# Patient Record
Sex: MALE | Race: BLACK OR AFRICAN AMERICAN
[De-identification: names, ages, dates, MRNs, and addresses within clinical notes are randomized per-mention and may not be internally consistent; named-entity substitution may affect disease eponyms.]

---
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
## 2020-12-27 ENCOUNTER — HOSPITAL ENCOUNTER (EMERGENCY)
Dept: HOSPITAL 63 - ER | Age: 20
Discharge: HOME | End: 2020-12-27
Payer: COMMERCIAL

## 2020-12-27 VITALS — WEIGHT: 250.45 LBS | HEIGHT: 72 IN | BODY MASS INDEX: 33.92 KG/M2

## 2020-12-27 VITALS — SYSTOLIC BLOOD PRESSURE: 124 MMHG | DIASTOLIC BLOOD PRESSURE: 60 MMHG

## 2020-12-27 DIAGNOSIS — R07.89: Primary | ICD-10-CM

## 2020-12-27 DIAGNOSIS — R42: ICD-10-CM

## 2020-12-27 LAB
ALBUMIN SERPL-MCNC: 3.9 G/DL (ref 3.4–5)
ALBUMIN/GLOB SERPL: 1 {RATIO} (ref 1–1.7)
ALP SERPL-CCNC: 193 U/L (ref 46–116)
ALT SERPL-CCNC: 27 U/L (ref 16–63)
ANION GAP SERPL CALC-SCNC: 10 MMOL/L (ref 6–14)
AST SERPL-CCNC: 15 U/L (ref 15–37)
BASOPHILS # BLD AUTO: 0 X10^3/UL (ref 0–0.2)
BASOPHILS NFR BLD: 0 % (ref 0–3)
BILIRUB SERPL-MCNC: 0.3 MG/DL (ref 0.2–1)
BUN/CREAT SERPL: 13 (ref 6–20)
CA-I SERPL ISE-MCNC: 12 MG/DL (ref 8–26)
CALCIUM SERPL-MCNC: 8.8 MG/DL (ref 8.5–10.1)
CHLORIDE SERPL-SCNC: 103 MMOL/L (ref 98–107)
CO2 SERPL-SCNC: 27 MMOL/L (ref 21–32)
CREAT SERPL-MCNC: 0.9 MG/DL (ref 0.7–1.3)
EOSINOPHIL NFR BLD: 0.2 X10^3/UL (ref 0–0.7)
EOSINOPHIL NFR BLD: 2 % (ref 0–3)
ERYTHROCYTE [DISTWIDTH] IN BLOOD BY AUTOMATED COUNT: 13.6 % (ref 11.5–14.5)
GFR SERPLBLD BASED ON 1.73 SQ M-ARVRAT: 128.9 ML/MIN
GLOBULIN SER-MCNC: 3.9 G/DL (ref 2.2–3.8)
GLUCOSE SERPL-MCNC: 98 MG/DL (ref 70–99)
HCT VFR BLD CALC: 49.3 % (ref 39–53)
HGB BLD-MCNC: 16.1 G/DL (ref 13–17.5)
LYMPHOCYTES # BLD: 1.8 X10^3/UL (ref 1–4.8)
LYMPHOCYTES NFR BLD AUTO: 23 % (ref 24–48)
MAGNESIUM SERPL-MCNC: 2.2 MG/DL (ref 1.8–2.4)
MCH RBC QN AUTO: 28 PG (ref 25–35)
MCHC RBC AUTO-ENTMCNC: 33 G/DL (ref 31–37)
MCV RBC AUTO: 85 FL (ref 79–100)
MONO #: 0.7 X10^3/UL (ref 0–1.1)
MONOCYTES NFR BLD: 9 % (ref 0–9)
NEUT #: 5.1 X10^3UL (ref 1.8–7.7)
NEUTROPHILS NFR BLD AUTO: 66 % (ref 31–73)
PLATELET # BLD AUTO: 295 X10^3/UL (ref 140–400)
POTASSIUM SERPL-SCNC: 3.5 MMOL/L (ref 3.5–5.1)
PROT SERPL-MCNC: 7.8 G/DL (ref 6.4–8.2)
RBC # BLD AUTO: 5.82 X10^6/UL (ref 4.3–5.7)
SODIUM SERPL-SCNC: 140 MMOL/L (ref 136–145)
WBC # BLD AUTO: 7.9 X10^3/UL (ref 4–11)

## 2020-12-27 PROCEDURE — 85025 COMPLETE CBC W/AUTO DIFF WBC: CPT

## 2020-12-27 PROCEDURE — 71045 X-RAY EXAM CHEST 1 VIEW: CPT

## 2020-12-27 PROCEDURE — 36415 COLL VENOUS BLD VENIPUNCTURE: CPT

## 2020-12-27 PROCEDURE — 99285 EMERGENCY DEPT VISIT HI MDM: CPT

## 2020-12-27 PROCEDURE — 80053 COMPREHEN METABOLIC PANEL: CPT

## 2020-12-27 PROCEDURE — 93005 ELECTROCARDIOGRAM TRACING: CPT

## 2020-12-27 PROCEDURE — 83735 ASSAY OF MAGNESIUM: CPT

## 2020-12-27 PROCEDURE — 84484 ASSAY OF TROPONIN QUANT: CPT

## 2020-12-27 NOTE — RAD
EXAM: CHEST 1 VIEW 



History: Chest pain 



COMPARISON: 9/23/2017



TECHNIQUE: Single portable radiograph of the chest



FINDINGS:  The cardiac silhouette is unremarkable. The lungs are clear bilaterally. The costophrenic 
sulci are clear and well demarcated.



IMPRESSION:  No radiographic evidence of an acute cardiopulmonary process.

 



Electronically signed by: Adithya Haynes MD (12/27/2020 7:34 AM) [id number]

## 2020-12-27 NOTE — PHYS DOC
Past History


Past Medical History:  Other


Past Surgical History:  No Surgical History


Smoking:  Non-smoker


Alcohol Use:  None


Drug Use:  None





Adult General


Chief Complaint


Chief Complaint:  CHEST PAIN





HPI


HPI





Patient is a 20M with no known past history presents emergency department for 

new onset of lightheadedness and right-sided chest pain.  Patient states that 

yesterday morning he awoke from sleep with a sensation of right-sided chest nehemiah

n.  Patient states he woke up and then believes that he passed out back onto his

bed.  Since that time has been complaining of pain in the right anterior chest 

with radiation to the right shoulder and the right jaw.  Patient states he had a

similar episode approximately 2 months ago and was worked up and there were no 

significant findings.  Patient denies any cough, nausea, vomiting, dizziness, 

lightheadedness, back pain or paresthesias.





Review of Systems


Review of Systems





Constitutional: Denies fever or chills []


Eyes: Denies change in visual acuity, redness, or eye pain []


HENT: Denies nasal congestion or sore throat []


Respiratory: Denies cough or shortness of breath []


Cardiovascular: No additional information not addressed in HPI []


GI: Denies abdominal pain, nausea, vomiting, bloody stools or diarrhea []


: Denies dysuria or hematuria []


Musculoskeletal: Denies back pain or joint pain []


Integument: Denies rash or skin lesions []


Neurologic: Denies headache, focal weakness or sensory changes []


Endocrine: Denies polyuria or polydipsia []





All other systems were reviewed and found to be within normal limits, except as 

documented in this note.





Allergies


Allergies





Allergies








Coded Allergies Type Severity Reaction Last Updated Verified


 


  No Known Drug Allergies    9/3/17 No











Physical Exam


Physical Exam





Constitutional: Well developed, well nourished, no acute distress, non-toxic 

appearance. []


HENT: Normocephalic, atraumatic, bilateral external ears normal, oropharynx 

moist, no oral exudates, nose normal. []


Eyes: PERRLA, EOMI, conjunctiva normal, no discharge. [] 


Neck: Normal range of motion, no tenderness, supple, no stridor. [] 


Cardiovascular:Heart rate regular rhythm, no murmur []


Lungs & Thorax:  Bilateral breath sounds clear to auscultation []


Abdomen: Bowel sounds normal, soft, no tenderness, no masses, no pulsatile 

masses. [] 


Skin: Warm, dry, no erythema, no rash. [] 


Back: No tenderness, no CVA tenderness. [] 


Extremities: No tenderness, no cyanosis, no clubbing, ROM intact, no edema. [] 


Neurologic: Alert and oriented X 3, normal motor function, normal sensory 

function, no focal deficits noted. []


Psychologic: Affect normal, judgement normal, mood normal. []





Heart Score


HEART Score for Chest Pain:  








HEART Score for Chest Pain Response (Comments) Value


 


History Slighlty/Non-Suspicious 0


 


ECG Normal 0


 


Age < 45 0


 


Risk Factors No Risk Factors 0


 


Troponin < Normal Limit 0


 


Total  0








Risk Factors:


Risk Factors:  DM, Current or recent (<one month) smoker, HTN, HLP, family 

history of CAD, obesity.


Risk Scores:


Risk Factors:  DM, Current or recent (<one month) smoker, HTN, HLP, family 

history of CAD, obesity.





Course & Med Decision Making


Course & Med Decision Making


Pertinent Labs and Imaging studies reviewed. (See chart for details)





20-year-old male presenting with nonspecific right anterior chest pain but does 

report a history of syncope.  Patient does not have any known history of 

congenital cardiac abnormalities.  Low suspicion for acute urinary syndrome but 

the story is concerning.  At this time will obtain basic cardiac work-up with 

labs and chest





Dragon Disclaimer


Dragon Disclaimer


This electronic medical record was generated, in whole or in part, using a voice

 recognition dictation system.





Departure


Departure:


Impression:  


   Primary Impression:  


   Nonspecific chest pain


Disposition:  01 DC HOME SELF CARE/HOMELESS


Condition:  GOOD


Referrals:  


RICHARD CHANEY MD (PCP)








DL JANE MD


Patient Instructions:  Chest Pain (Nonspecific)





Additional Instructions:  


Thank you for visiting our emergency department.  You are seen for your chest 

pain.  An EKG, chest x-ray labs were done which did not demonstrate any obvious 

abnormality.  We strongly recommend he follow-up with a cardiologist as soon as 

possible.





Please return to the emergency department if there is any sudden worsening of 

your symptoms, dizziness or lightheadedness or fever greater than 101 F











ASHLEIGH CHO MD              Dec 27, 2020 07:21

## 2020-12-28 NOTE — EKG
[facility] [address], [place] [address]

Test Date:    2020               Test Time:    07:03:25

Pat Name:     MARCI JOSE             Department:   

Patient ID:   [id number]           Room:          

Gender:       M                        Technician:   PEEWEE

:          2000               Requested By: ASHLEIGH CHO

Order Number: [id number]            Reading MD:     

                                 Measurements

Intervals                              Axis          

Rate:         71                       P:            39

KS:           184                      QRS:          -61

QRSD:         94                       T:            0

QT:           382                                    

QTc:          420                                    

                           Interpretive Statements

SINUS RHYTHM

ABNORMAL LEFT AXIS DEVIATION

S1,S2,S3 PATTERN

LEFT ANTERIOR FASCICULAR BLOCK

ABNORMAL ECG

RI6.02

No previous ECG available for comparison

## 2021-05-10 ENCOUNTER — HOSPITAL ENCOUNTER (EMERGENCY)
Dept: HOSPITAL 63 - ER | Age: 21
Discharge: HOME | End: 2021-05-10
Payer: COMMERCIAL

## 2021-05-10 VITALS — HEIGHT: 72 IN | BODY MASS INDEX: 33.92 KG/M2 | WEIGHT: 250.45 LBS

## 2021-05-10 VITALS — SYSTOLIC BLOOD PRESSURE: 118 MMHG | DIASTOLIC BLOOD PRESSURE: 68 MMHG

## 2021-05-10 DIAGNOSIS — H92.01: Primary | ICD-10-CM

## 2021-05-10 NOTE — PHYS DOC
Past History


Past Medical History:  No Pertinent History, Other


 (IOANA BOSS)


Past Surgical History:  No Surgical History


 (IOANA BOSS)


Smoking:  Non-smoker


Alcohol Use:  None


Drug Use:  None


 (IOANA BOSS)





General Adult


EDM:


Chief Complaint:  EARACHE/EAR PAIN





HPI:


HPI:





Patient is a 20-year-old male who presents with right ear pain.  Patient states 

that he was put on clindamycin yesterday for dental infection.  Patient to start

taking clindamycin today.  Denies facial swelling.  Denies fevers.  Patient 

states has been taking ibuprofen at home with some relief.  Denies medical 

history.


 (IOANA BOSS)





Review of Systems:


Review of Systems:


Constitutional:  Denies fever or chills 


Eyes:  Denies change in visual acuity 


HENT: Reports right ear pain, right-sided dental pain, denies nasal congestion 

or sore throat 


Respiratory:  Denies cough or shortness of breath 


Cardiovascular:  Denies chest pain or edema 


GI:  Denies abdominal pain, nausea, vomiting, bloody stools or diarrhea 


: Denies dysuria 


Musculoskeletal:  Denies back pain or joint pain 


Integument:  Denies rash 


Neurologic:  Denies headache, focal weakness or sensory changes 


Endocrine:  Denies polyuria or polydipsia 


Lymphatic:  Denies swollen glands 


Psychiatric:  Denies depression or anxiety


 (IOANA BOSS)





Allergies:


Allergies:





Allergies








Coded Allergies Type Severity Reaction Last Updated Verified


 


  No Known Drug Allergies    9/3/17 No








 (IOANA BOSS)





Physical Exam:


PE:





Constitutional: Well developed, well nourished, no acute distress, non-toxic 

appearance. []


HENT:  bilateral external ears normal, oropharynx moist, no oral exudates, nose 

normal. []


Eyes: PERRLA, EOMI, conjunctiva normal, no discharge. [] 


Cardiovascular:Heart rate regular rhythm, no murmur []


Lungs & Thorax:  Bilateral breath sounds clear to auscultation []


 (IOANA BOSS)





EKG:


EKG:


[]


 (IOANA BOSS)





Radiology/Procedures:


Radiology/Procedures:


[]


 (IOANA BOSS)





Heart Score:


C/O Chest Pain:  No


Risk Factors:


Risk Factors:  DM, Current or recent (<one month) smoker, HTN, HLP, family 

history of CAD, obesity.


Risk Scores:


Score 0 - 3:  2.5% MACE over next 6 weeks - Discharge Home


Score 4 - 6:  20.3% MACE over next 6 weeks - Admit for Clinical Observation


Score 7 - 10:  72.7% MACE over next 6 weeks - Early Invasive Strategies


 (IOANA BOSS)





Course & Med Decision Making:


Course & Med Decision Making


Pertinent Labs and Imaging studies reviewed. (See chart for details)





[] 19-year-old male presents with right ear pain.  Patient to start taking 

clindamycin today for a dental infection.  Patient was seen at [facility] and 

prescribed antibiotic.  Patient given hydrocodone for discomfort.  Explained to 

patient his ear pain is coming from his dental infection.  Patient is to follow 

back up with his PCP on Wednesday if he is still having discomfort.  Continue 

taking ibuprofen and Tylenol for pain.


 (IOANA BOSS)





Dragon Disclaimer:


Dragon Disclaimer:


This electronic medical record was generated, in whole or in part, using a voice

 recognition dictation system.


 (IOANA BOSS)


Attending Co-Sign


The patient was seen and interviewed as well as examined at the bedside. The 

chart was reviewed. The case was discussed. Agree with the plan of care.


 (RICK TOMLINSON DO)





Departure


Departure:


Impression:  


   Primary Impression:  


   Ear pain, right


Disposition:  01 HOME / SELF CARE / HOMELESS


Condition:  STABLE


Referrals:  


PCP,NO (PCP)


Patient Instructions:  Dental Pain





Additional Instructions:  


You were seen in the emergency room for dental pain and right ear pain.  You 

just started clindamycin today for dental infection.  Continue taking antibiotic

 as directed.  You should see some improvement in the next 24 hours with your 

discomfort.  Continue taking ibuprofen and Tylenol for pain.  Follow-up with 

your PCP on Wednesday if your noticing swelling, fever, increasing pain.  Return

 to the emergency room with worsening symptoms or concerns.





EMERGENCY DEPARTMENT GENERAL DISCHARGE INSTRUCTIONS





Thank you for coming to [facility] (ED) today and trusting us

 with you 


care.  We trust that you had a positivie experience in our Emergency Department.

  If you 


wish to speak to the department management, you may call the director at [phone].





YOUR FOLLOW UP INSTRUCTIONS ARE AS FOLLOWS:





1.  Do you have a private Doctor?  If you do not have a private doctor, please 

ask for a 


resource list of physicians or clinics that may be able to assist you with 

follow up care.





2.  The Emergency Physician has interpreted your x-rays.  The X-Ray specialist 

will also 


review them.  If there is a change in the findings, you will be notified in 48 

hours when at 


all possible.





3.  A lab test or culture has been done, your results will be reviewed and you 

will be 


notified if you need a change in treatment.





ADDITIONAL INSTRUCTIONS AND INFORMATION:





1.  Your care today has been supervised by a physician who is specially trained 

in emergency 


care.  Many problems require more than one evaluation for a complete diagnosis 

and 


treatment.  We recommend that you schedule your follow up appointment as 

recommended to 


ensure complete treatment of you illness or injury.  If you are unable to obtain

 follow up 


care and continue to have a problem, or if your condition worsens, we recommend 

that you 


return to the ED.





2.  We are not able to safely determine your condition over the phone nor are we

 able to 


give sound medical advice over the phone.  For these safety reasons, if you call

 for medical 


advice we will ask you to come to the ED for further evaluation.





3.  If you have any questions regarding these discharge instructions please call

 the ED at 


[phone].





SAFETY INFORMATION:





In the interest of safety, wellness, and injury prevention; we encourage you to 

wear your 


sealbelt, if you smoke; quite smoking, and we encourage family to use a 

protective helmet 


for bicycling and other sporting events that present an increased risk for head 

injury.





IF YOUR SYMPTOMS WORSEN OR NEW SYMPTOMS DEVELOP, OR YOU HAVE CONCERNS ABOUT YOUR

 CONDITION; 


OR IF YOUR CONDITION WORSENS WHILE YOU ARE WAITING FOR YOUR FOLLOW UP 

APPOINTMENT; EITHER 


CONTACT YOUR PRIMARY CARE DOCTOR, THE PHYSICIAN WHOSE NAME AND NUMBER YOU WERE 

GIVEN, OR 


RETURN TO THE ED IMMEDIATELY.











IOANA BOSS               May 10, 2021 21:28


RICK TOMLINSON DO                 May 14, 2021 06:33

## 2021-07-23 ENCOUNTER — HOSPITAL ENCOUNTER (EMERGENCY)
Dept: HOSPITAL 63 - ER | Age: 21
Discharge: HOME | End: 2021-07-23
Payer: COMMERCIAL

## 2021-07-23 VITALS — HEIGHT: 72 IN | WEIGHT: 244.71 LBS | BODY MASS INDEX: 33.15 KG/M2

## 2021-07-23 VITALS — SYSTOLIC BLOOD PRESSURE: 122 MMHG | DIASTOLIC BLOOD PRESSURE: 62 MMHG

## 2021-07-23 DIAGNOSIS — S13.9XXA: Primary | ICD-10-CM
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
## 2021-07-23 DIAGNOSIS — S29.012A: ICD-10-CM

## 2021-07-23 PROCEDURE — 96372 THER/PROPH/DIAG INJ SC/IM: CPT

## 2021-07-23 PROCEDURE — 71046 X-RAY EXAM CHEST 2 VIEWS: CPT

## 2021-07-23 PROCEDURE — 99284 EMERGENCY DEPT VISIT MOD MDM: CPT

## 2021-07-23 PROCEDURE — 72125 CT NECK SPINE W/O DYE: CPT
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
## 2021-12-15 ENCOUNTER — HOSPITAL ENCOUNTER (EMERGENCY)
Dept: HOSPITAL 63 - ER | Age: 21
Discharge: HOME | End: 2021-12-15
Payer: COMMERCIAL

## 2021-12-15 VITALS — HEIGHT: 72 IN | BODY MASS INDEX: 26.87 KG/M2 | WEIGHT: 198.42 LBS

## 2021-12-15 VITALS — DIASTOLIC BLOOD PRESSURE: 68 MMHG | SYSTOLIC BLOOD PRESSURE: 137 MMHG

## 2021-12-15 DIAGNOSIS — S30.810A: Primary | ICD-10-CM
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
## 2021-12-15 DIAGNOSIS — Z91.041: ICD-10-CM
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
## 2021-12-15 PROCEDURE — 99282 EMERGENCY DEPT VISIT SF MDM: CPT

## 2021-12-15 NOTE — PHYS DOC
Past History


Past Medical History:  No Pertinent History, Other


 (IOANA BOSS)


Past Surgical History:  No Surgical History


Additional Past Surgical Histo:  HERNIA REPAIR


 (IOANA BOSS)


Smoking:  Non-smoker


Alcohol Use:  Occasionally


Drug Use:  None


 (IOANA BOSS)





General Adult


EDM:


Chief Complaint:  BACK PAIN OR INJURY





HPI:


HPI:





Patient is a 21-year-old male who presents with pain near his coccyx.  Denies 

injury.  Pain is worse with walking.  Denies taking anything for pain.  

Up-to-date on tetanus.  Denies medical history.


 (IOANA BOSS)





Review of Systems:


Review of Systems:


ROS


At least 10 ROS systems have been reviewed and are negative except as documented

in the HPI.


General: Negative except as outlined in HPI above.


Skin: Negative except as outlined in HPI above.


HEENT: Negative except as outlined in HPI above.


Neck: Negative except as outlined in HPI above.


Respiratory: Negative except as outlined in HPI above..


Cardiovascular: Negative except as outlined in HPI above.


Abdomen: Negative except as outlined in HPI above.


: Negative except as outlined in HPI above.


Back/MSK: Negative except as outlined in HPI above.


Neuro: Negative except as outlined in HPI above.


Psych: Negative except as outlined in HPI above.


 (IOANA BOSS)





Allergies:


Allergies:





Allergies








Coded Allergies Type Severity Reaction Last Updated Verified


 


  Iodinated Contrast Media Allergy Unknown  7/23/21 Yes








 (IOANA BOSS)





Physical Exam:


PE:





Constitutional: Well developed, well nourished, no acute distress, non-toxic 

appearance. []


HENT: Normocephalic, atraumatic, bilateral external ears normal, oropharynx 

moist, no oral exudates, nose normal. []


Eyes: PERRLA, EOMI, conjunctiva normal, no discharge. [] 


Neck: Normal range of motion, no tenderness, supple, no stridor. [] 


Cardiovascular:Heart rate regular rhythm, no murmur []


Lungs & Thorax:  Bilateral breath sounds clear to auscultation []


Abdomen: Bowel sounds normal, soft, no tenderness, no masses, no pulsatile mass

es. [] 


Skin: Warm, dry, no erythema, no rash. [] 


Back: No tenderness, no CVA tenderness.  Half a centimeter abrasion to coccyx


Extremities: No tenderness, no cyanosis, no clubbing, ROM intact, no edema. [] 


Neurologic: Alert and oriented X 3, normal motor function, normal sensory 

function, no focal deficits noted. []


Psychologic: Affect normal, judgement normal, mood normal. []


 (IOANA BOSS)





Current Patient Data:


Vital Signs:





                                   Vital Signs








  Date Time  Temp Pulse Resp B/P (MAP) Pulse Ox O2 Delivery O2 Flow Rate FiO2


 


12/15/21 20:53 98.2 70 16 137/68 (91) 98 Room Air  








 (IOANA BOSS)





EKG:


EKG:


[]


 (IOANA BOSS)





Radiology/Procedures:


Radiology/Procedures:


[]


 (IOANA BOSS)





Heart Score:


C/O Chest Pain:  No


Risk Factors:


Risk Factors:  DM, Current or recent (<one month) smoker, HTN, HLP, family 

history of CAD, obesity.


Risk Scores:


Score 0 - 3:  2.5% MACE over next 6 weeks - Discharge Home


Score 4 - 6:  20.3% MACE over next 6 weeks - Admit for Clinical Observation


Score 7 - 10:  72.7% MACE over next 6 weeks - Early Invasive Strategies


 (IOANA BOSS)





Course & Med Decision Making:


Course & Med Decision Making


Pertinent Labs and Imaging studies reviewed. (See chart for details)





[] 21-year-old male presents with small abrasion to his coccyx.  Pain is worse 

with movement.  Up-to-date on tetanus.  No drainage from the abrasion.  Bandage 

placed over abrasion.  Explained to patient to keep the area clean and dry and 

to wear the bandage so that it did not rub on the wound.  Patient states he 

understands.  Motrin for pain.  Follow-up with your PCP if pain does not 

improve.  Patient is asking for a work note for tomorrow.  Patient is 

hemodynamically stable upon disposition.


 (IOANA BOSS)


Course & Med Decision Making


Did not see or evaluate patient.  Did not discuss patient with NP.  Agree with 

NP's work-up and disposition per note.


 (DEB JACKSON MD)


Dragon Disclaimer:


Dragon Disclaimer:


This electronic medical record was generated, in whole or in part, using a voice

 recognition dictation system.


 (BOSS,IOANA APRN)





Departure


Departure:


Impression:  


   Primary Impression:  


   Abrasion of buttock


   Qualified Codes:  S30.810A - Abrasion of lower back and pelvis, initial 

   encounter


Disposition:  01 HOME / SELF CARE / HOMELESS


Condition:  STABLE


Referrals:  


PCP,NO (PCP)


Patient Instructions:  Abrasion, Easy-to-Read





Additional Instructions:  


EMERGENCY DEPARTMENT GENERAL DISCHARGE INSTRUCTIONS





Thank you for coming to [facility] (ED) today and trusting us

 with you 


care.  We trust that you had a positivie experience in our Emergency Department.

  If you 


wish to speak to the department management, you may call the director at 

[phone].





YOUR FOLLOW UP INSTRUCTIONS ARE AS FOLLOWS:





1.  Do you have a private Doctor?  If you do not have a private doctor, please 

ask for a 


resource list of physicians or clinics that may be able to assist you with 

follow up care.





2.  The Emergency Physician has interpreted your x-rays.  The X-Ray specialist 

will also 


review them.  If there is a change in the findings, you will be notified in 48 

hours when at 


all possible.





3.  A lab test or culture has been done, your results will be reviewed and you 

will be 


notified if you need a change in treatment.





ADDITIONAL INSTRUCTIONS AND INFORMATION:





1.  Your care today has been supervised by a physician who is specially trained 

in emergency 


care.  Many problems require more than one evaluation for a complete diagnosis 

and 


treatment.  We recommend that you schedule your follow up appointment as 

recommended to 


ensure complete treatment of you illness or injury.  If you are unable to obtain

 follow up 


care and continue to have a problem, or if your condition worsens, we recommend 

that you 


return to the ED.





2.  We are not able to safely determine your condition over the phone nor are we

 able to 


give sound medical advice over the phone.  For these safety reasons, if you call

 for medical 


advice we will ask you to come to the ED for further evaluation.





3.  If you have any questions regarding these discharge instructions please call

 the ED at 


[phone].





SAFETY INFORMATION:





In the interest of safety, wellness, and injury prevention; we encourage you to 

wear your 


sealbelt, if you smoke; quite smoking, and we encourage family to use a pro

tective helmet 


for bicycling and other sporting events that present an increased risk for head 

injury.





IF YOUR SYMPTOMS WORSEN OR NEW SYMPTOMS DEVELOP, OR YOU HAVE CONCERNS ABOUT YOUR

 CONDITION; 


OR IF YOUR CONDITION WORSENS WHILE YOU ARE WAITING FOR YOUR FOLLOW UP 

APPOINTMENT; EITHER 


CONTACT YOUR PRIMARY CARE DOCTOR, THE PHYSICIAN WHOSE NAME AND NUMBER YOU WERE 

GIVEN, OR 


RETURN TO THE ED IMMEDIATELY.











IOANA BOSS               Dec 15, 2021 21:28


DEB JACKSON MD               Dec 16, 2021 02:01

## 2021-12-25 ENCOUNTER — HOSPITAL ENCOUNTER (EMERGENCY)
Dept: HOSPITAL 63 - ER | Age: 21
Discharge: HOME | End: 2021-12-25
Payer: COMMERCIAL

## 2021-12-25 VITALS — WEIGHT: 262.35 LBS | HEIGHT: 72 IN | BODY MASS INDEX: 35.53 KG/M2

## 2021-12-25 VITALS — SYSTOLIC BLOOD PRESSURE: 123 MMHG | DIASTOLIC BLOOD PRESSURE: 78 MMHG

## 2021-12-25 DIAGNOSIS — B34.9: ICD-10-CM

## 2021-12-25 DIAGNOSIS — Z91.041: ICD-10-CM

## 2021-12-25 DIAGNOSIS — U07.1: Primary | ICD-10-CM

## 2021-12-25 LAB
INFLUENZA A PATIENT: NEGATIVE
INFLUENZA B PATIENT: POSITIVE

## 2021-12-25 PROCEDURE — U0003 INFECTIOUS AGENT DETECTION BY NUCLEIC ACID (DNA OR RNA); SEVERE ACUTE RESPIRATORY SYNDROME CORONAVIRUS 2 (SARS-COV-2) (CORONAVIRUS DISEASE [COVID-19]), AMPLIFIED PROBE TECHNIQUE, MAKING USE OF HIGH THROUGHPUT TECHNOLOGIES AS DESCRIBED BY CMS-2020-01-R: HCPCS

## 2021-12-25 PROCEDURE — 99283 EMERGENCY DEPT VISIT LOW MDM: CPT

## 2021-12-25 PROCEDURE — C9803 HOPD COVID-19 SPEC COLLECT: HCPCS

## 2021-12-25 PROCEDURE — 87804 INFLUENZA ASSAY W/OPTIC: CPT

## 2021-12-25 NOTE — PHYS DOC
Past History


Past Medical History:  No Pertinent History, Other


 (WILFREDO ANGELES)


Past Surgical History:  Other


Additional Past Surgical Histo:  HERNIA REPAIR


 (WILFREDO ANGELES)


Smoking:  Non-smoker


Alcohol Use:  Occasionally


Drug Use:  None


 (WILFREDO ANGELES)





General Adult


EDM:


Chief Complaint:  GENERALIZED BODY ACHES





HPI:


HPI:





Patient is a 21 year old male who presents with body aches, fever, shortness of 

breath that began earlier this afternoon.  Patient reports pleuritic chest pain 

that is worse when he lays on his chest or takes a deep breath.  He states his 

dad had a similar illness "a couple weeks ago."  His dad was tested for Covid at

that time, which came back negative.  He denies chills, night sweats, nasal 

congestion, sore throat, palpitations, abdominal pain, NVD.


 (WILFREDO ANGELES)





Review of Systems:


Review of Systems:


ROS negative except as mentioned in HPI.


 (WILFREDO ANGELES)





Current Medications:


Current Meds:





Current Medications








 Medications


  (Trade)  Dose


 Ordered  Sig/Andriy  Start Time


 Stop Time Status Last Admin


Dose Admin


 


 Ibuprofen


  (Motrin)  600 mg  1X  ONCE  12/25/21 21:30


 12/25/21 21:31 DC 12/25/21 21:30


600 MG








 (WILFREDO ANGELES)





Allergies:


Allergies:





Allergies








Coded Allergies Type Severity Reaction Last Updated Verified


 


  Iodinated Contrast Media Allergy Unknown  7/23/21 Yes








 (WILFREDO ANGELES)





Physical Exam:


PE:





Constitutional: Well developed, well nourished, no acute distress, non-toxic 

appearance, patient appears fatigued.


HENT: Normocephalic, atraumatic, bilateral external ears without deformity or 

discharge, oropharynx moist, no oral exudates, nose without deformity or dischar

ge.


Eyes: PERRLA, EOMI, conjunctiva normal, no discharge.


Neck: Normal range of motion, no tenderness, supple, no stridor.


Cardiovascular: Heart rate regular rhythm, no murmur.


Lungs & Thorax: Bilateral breath sounds clear to auscultation.


Skin: Warm, dry, no erythema, no rash.


 (WILFREDO ANGELES)





Current Patient Data:


Vital Signs:





                                   Vital Signs








  Date Time  Temp Pulse Resp B/P (MAP) Pulse Ox O2 Delivery O2 Flow Rate FiO2


 


12/25/21 21:19 102.2 96 16 134/79 (97) 99 Room Air  








 (WILFREDO ANGELES)





Heart Score:


C/O Chest Pain:  N/A


 (WILFREDO ANGELES)





Course & Med Decision Making:


Course & Med Decision Making


Pertinent Labs and Imaging studies reviewed. (See chart for details)





Otherwise healthy 21-year-old male presents with fever, shortness of breath, 

body aches.  His dad recently had similar symptoms and tested negative for 

COVID-19.  Swabs today will be obtained for flu A & B as well as COVID-19.  

Patient provided with ibuprofen in the department to treat his body aches and 

fever.





Patient later admits to taking four testosterone boosting pills prior to symptom

onset.





Patient care was transferred to Dr. Benitez while awaiting flu A&B results.


 (WILFREDO ANGELES)


Course & Med Decision Making


Impression:





1. Influ. A Positive


 (SAFIA BENITEZ MD)


Dragon Disclaimer:


Dragon Disclaimer:


This electronic medical record was generated, in whole or in part, using a voice

recognition dictation system.


 (WILFREDO ANGELES)





Departure


Departure:


Impression:  


   Primary Impression:  


   Acute viral syndrome


   Additional Impression:  


   Suspected COVID-19 virus infection


Disposition:  01 HOME / SELF CARE / HOMELESS


Condition:  STABLE


Referrals:  


PCP,LORI (PCP)


Patient Instructions:  Viral Syndrome





Additional Instructions:  


Take ibuprofen alternating with acetaminophen every 4 hours for body aches and 

fever.





You have been tested for or diagnosed with COVID-19. It is an infection caused 

by a new type of coronavirus. COVID-19 will cause cold-like or mild flu symptoms

in most. It can cause more severe symptoms like problems breathing in some.





There is no treatment for COVID-19. The body will clear the infection over time.

Self-care will help to ease discomfort. Rest as needed. Healthy habits may help 

you feel better. 





Steps include:


 - Choose healthy foods including fruits and vegetables. Drink water throughout 

the day.


 - Get plenty of sleep each night.


 - If you smoke, try to quit. It may ease breathing.


 - Avoid alcohol.


 - Keep Others Healthy


 - The virus can spread to others. 





Droplets are released every time you sneeze or cough. The droplets can get into 

the mouth, nose, or eyes of people near you and lead to infection. To lower the 

chances of spreading COVID-19 to others:





Stay at home until your doctor has said it is safe to leave. If you tested 

positive this will mean staying isolated until both of the following are true:


 - At least 10 days have passed since the start of illness.


 - You are free of fever for at least 72 hours without the use of medicine.





During this time:


 - Avoid public areas, events, or transportation. Do not return to work or 

school until your doctor has said it is safe to do so.


 - Call ahead if you need to go to a medical center. Let them know you may have 

COVID-19. It will help them guide you where to go. They may also ask you to wear

a facemask when you come to the office.


 - If you call for emergency medical services, let them know you may have COVID-

19.





While at home:


 - Try to avoid close contact with others. Stay about 6 feet away.


 - If possible, spend most of your time in a separate room from others.


 - Use a face mask if you will be in close contact with others such as sharing a

room or vehicle.


 - Have someone wipe down common surfaces in the home. Use household  

every day on areas like doorknobs, counters, or sinks.


 - Cough or sneeze into a tissue. Throw the tissue away right after use. If a 

tissue is not available, cough or sneeze into your elbow.


 - Wash your hands often. Wash them after sneezing or coughing. Use soap and 

water and wash for at least 20 seconds. Alcohol based hand  can be used 

if soap and water is not available.


 - Do not prepare food for others. Avoid sharing personal items like forks, 

spoons, or toothbrushes.


 - Avoid close contact with pets while you are sick. There is no evidence of the

virus passing to pets. This is a safety step until more is known about this 

virus.


 - Isolation can be frustrating. Social interaction can help. Keep in touch with

friends and family through phone and tech options. You can still interact with 

others in your home, just keep a safe distance of about 6 feet.





Follow-up:


 - Your doctors office will check in with you to see if there are any changes 

in your health. 


 - You may be asked to keep track of symptoms to share with them. They will also

let you know when you are clear to be in public again.





Contact your doctor if your recovery is not going as you expect. Get emergency 

care if you have problems such as:


 - Trouble breathing


 - Nonstop chest pain or pressure


 - Changes in awareness, confusion, or problems waking


 - Lips or face have bluish color


 - Worsening of symptoms





If you think you have an emergency, call for emergency medical services right 

away.





As taken from [facility]


Scripts


Oseltamivir Phosphate (TAMIFLU) 75 Mg Capsule


75 MG PO BID for Flu A for 5 Days, #10 CAP


   Prov: SAFIA BENITEZ MD         12/25/21











WILFREDO ANGELES              Dec 25, 2021 21:43


SAFIA BENITEZ MD           Dec 25, 2021 22:28